# Patient Record
Sex: MALE
[De-identification: names, ages, dates, MRNs, and addresses within clinical notes are randomized per-mention and may not be internally consistent; named-entity substitution may affect disease eponyms.]

---

## 2017-07-28 PROBLEM — Z00.00 ENCOUNTER FOR PREVENTIVE HEALTH EXAMINATION: Status: ACTIVE | Noted: 2017-07-28

## 2017-08-02 ENCOUNTER — APPOINTMENT (OUTPATIENT)
Dept: DERMATOLOGY | Facility: CLINIC | Age: 57
End: 2017-08-02
Payer: MEDICARE

## 2017-08-02 VITALS — BODY MASS INDEX: 26.6 KG/M2 | WEIGHT: 190 LBS | HEIGHT: 71 IN

## 2017-08-02 DIAGNOSIS — Z86.39 PERSONAL HISTORY OF OTHER ENDOCRINE, NUTRITIONAL AND METABOLIC DISEASE: ICD-10-CM

## 2017-08-02 DIAGNOSIS — Z86.018 PERSONAL HISTORY OF OTHER BENIGN NEOPLASM: ICD-10-CM

## 2017-08-02 DIAGNOSIS — D49.2 NEOPLASM OF UNSPECIFIED BEHAVIOR OF BONE, SOFT TISSUE, AND SKIN: ICD-10-CM

## 2017-08-02 DIAGNOSIS — G90.50 COMPLEX REGIONAL PAIN SYNDROME I, UNSPECIFIED: ICD-10-CM

## 2017-08-02 DIAGNOSIS — Z85.828 PERSONAL HISTORY OF OTHER MALIGNANT NEOPLASM OF SKIN: ICD-10-CM

## 2017-08-02 DIAGNOSIS — Z87.39 PERSONAL HISTORY OF OTHER DISEASES OF THE MUSCULOSKELETAL SYSTEM AND CONNECTIVE TISSUE: ICD-10-CM

## 2017-08-02 DIAGNOSIS — Z86.79 PERSONAL HISTORY OF OTHER DISEASES OF THE CIRCULATORY SYSTEM: ICD-10-CM

## 2017-08-02 DIAGNOSIS — Z87.2 PERSONAL HISTORY OF DISEASES OF THE SKIN AND SUBCUTANEOUS TISSUE: ICD-10-CM

## 2017-08-02 DIAGNOSIS — Z80.8 FAMILY HISTORY OF MALIGNANT NEOPLASM OF OTHER ORGANS OR SYSTEMS: ICD-10-CM

## 2017-08-02 PROCEDURE — 99214 OFFICE O/P EST MOD 30 MIN: CPT

## 2017-08-02 RX ORDER — ASPIRIN 81 MG
81 TABLET, DELAYED RELEASE (ENTERIC COATED) ORAL
Refills: 0 | Status: ACTIVE | COMMUNITY

## 2017-08-02 RX ORDER — NAPROXEN 500 MG/1
500 TABLET ORAL
Refills: 0 | Status: ACTIVE | COMMUNITY

## 2018-07-28 PROBLEM — Z80.8 FAMILY HISTORY OF BASAL CELL CARCINOMA: Status: ACTIVE | Noted: 2017-08-02

## 2018-09-08 ENCOUNTER — TRANSCRIPTION ENCOUNTER (OUTPATIENT)
Age: 58
End: 2018-09-08

## 2019-01-30 ENCOUNTER — APPOINTMENT (OUTPATIENT)
Dept: DERMATOLOGY | Facility: CLINIC | Age: 59
End: 2019-01-30

## 2019-06-26 ENCOUNTER — APPOINTMENT (OUTPATIENT)
Dept: DERMATOLOGY | Facility: CLINIC | Age: 59
End: 2019-06-26
Payer: MEDICARE

## 2019-06-26 DIAGNOSIS — Z85.828 PERSONAL HISTORY OF OTHER MALIGNANT NEOPLASM OF SKIN: ICD-10-CM

## 2019-06-26 DIAGNOSIS — D22.5 MELANOCYTIC NEVI OF TRUNK: ICD-10-CM

## 2019-06-26 DIAGNOSIS — D18.00 HEMANGIOMA UNSPECIFIED SITE: ICD-10-CM

## 2019-06-26 DIAGNOSIS — D48.9 NEOPLASM OF UNCERTAIN BEHAVIOR, UNSPECIFIED: ICD-10-CM

## 2019-06-26 DIAGNOSIS — C43.9 MALIGNANT MELANOMA OF SKIN, UNSPECIFIED: ICD-10-CM

## 2019-06-26 PROCEDURE — 11102 TANGNTL BX SKIN SINGLE LES: CPT

## 2019-06-26 PROCEDURE — 99213 OFFICE O/P EST LOW 20 MIN: CPT | Mod: 25

## 2019-06-26 RX ORDER — SIMVASTATIN 20 MG/1
20 TABLET, FILM COATED ORAL
Refills: 0 | Status: ACTIVE | COMMUNITY

## 2019-06-26 RX ORDER — LOSARTAN POTASSIUM 100 MG/1
100 TABLET, FILM COATED ORAL
Refills: 0 | Status: ACTIVE | COMMUNITY

## 2019-06-26 RX ORDER — FINASTERIDE 1 MG/1
TABLET ORAL
Refills: 0 | Status: ACTIVE | COMMUNITY

## 2019-06-26 RX ORDER — AMLODIPINE BESYLATE 10 MG/1
10 TABLET ORAL
Refills: 0 | Status: ACTIVE | COMMUNITY

## 2019-06-26 RX ORDER — LOSARTAN POTASSIUM AND HYDROCHLOROTHIAZIDE 12.5; 1 MG/1; MG/1
100-12.5 TABLET ORAL
Refills: 0 | Status: DISCONTINUED | COMMUNITY
End: 2019-06-26

## 2019-06-26 RX ORDER — HYDROCHLOROTHIAZIDE 25 MG/1
25 TABLET ORAL
Refills: 0 | Status: ACTIVE | COMMUNITY

## 2019-06-26 RX ORDER — ATENOLOL 100 MG/1
100 TABLET ORAL
Refills: 0 | Status: DISCONTINUED | COMMUNITY
End: 2019-06-26

## 2019-06-26 RX ORDER — METFORMIN HYDROCHLORIDE 625 MG/1
TABLET ORAL
Refills: 0 | Status: ACTIVE | COMMUNITY

## 2019-06-26 RX ORDER — DULOXETINE HYDROCHLORIDE 60 MG/1
60 CAPSULE, DELAYED RELEASE ORAL
Refills: 0 | Status: ACTIVE | COMMUNITY

## 2019-06-26 NOTE — PHYSICAL EXAM
[Full Body Skin Exam Performed] : performed [FreeTextEntry3] : Skin examination performed of the face, neck, trunk, arms, legs; \par The patient is well, alert and oriented, pleasant and cooperative.\par Eyelids, conjunctivae, oral mucosa, digits and nails all normal.  \par No cervical adenopathy.\par \par Normal findings include:\par  \par Multiple benign nevi were noted. Some nevi exhibited mildly atypical features, but none were suspicious for malignancy. \par No changes in nevi from prior photograph; ; many regressed\par Nose healed well;\par \par Angiomas\par Lentigines\par scaling pearly erythematous patch right upper abdomen\par \par

## 2019-06-26 NOTE — ASSESSMENT
[FreeTextEntry1] : The patient was instructed to check portal and/or call the office in one week for biopsy results. \par Plan to treat by D&C if the biopsy is positive. \par \par No changes in nevus  from prior photograph; \par \par cont annual exams;

## 2019-06-26 NOTE — HISTORY OF PRESENT ILLNESS
[de-identified] : Pt. presents for skin check;\par one new inflamed lesion on the right upper abdomen;  bled once\par Multiple nevi, presents with body photos\par Hx MM, BCC\par

## 2019-07-10 ENCOUNTER — TRANSCRIPTION ENCOUNTER (OUTPATIENT)
Age: 59
End: 2019-07-10

## 2019-07-15 LAB — CORE LAB BIOPSY: NORMAL

## 2019-07-19 ENCOUNTER — TRANSCRIPTION ENCOUNTER (OUTPATIENT)
Age: 59
End: 2019-07-19

## 2019-08-05 ENCOUNTER — APPOINTMENT (OUTPATIENT)
Dept: DERMATOLOGY | Facility: CLINIC | Age: 59
End: 2019-08-05
Payer: MEDICARE

## 2019-08-05 DIAGNOSIS — C44.519 BASAL CELL CARCINOMA OF SKIN OF OTHER PART OF TRUNK: ICD-10-CM

## 2019-08-05 PROCEDURE — 17262 DSTRJ MAL LES T/A/L 1.1-2.0: CPT

## 2019-11-14 ENCOUNTER — APPOINTMENT (OUTPATIENT)
Dept: DERMATOLOGY | Facility: CLINIC | Age: 59
End: 2019-11-14
Payer: MEDICARE

## 2019-11-14 DIAGNOSIS — L57.0 ACTINIC KERATOSIS: ICD-10-CM

## 2019-11-14 PROCEDURE — 99213 OFFICE O/P EST LOW 20 MIN: CPT

## 2019-11-14 NOTE — ASSESSMENT
[FreeTextEntry1] : Likely AK - subtle;  within prior surgical site;\par Efudex x 2 weeks;  Risks and benefits of topical actinic keratosis treatments were discussed including redness, scaling, discomfort crusting, oozing, and recurrence. \par may wait until January\par Continue regular exams;

## 2019-11-14 NOTE — HISTORY OF PRESENT ILLNESS
[de-identified] : The patient has been fit in for urgent appointment. \par c/o scaling patches in area of prior Mohs procedure on nose;

## 2020-07-14 ENCOUNTER — APPOINTMENT (OUTPATIENT)
Dept: DERMATOLOGY | Facility: CLINIC | Age: 60
End: 2020-07-14

## 2022-01-13 ENCOUNTER — APPOINTMENT (OUTPATIENT)
Dept: ORTHOPEDIC SURGERY | Facility: CLINIC | Age: 62
End: 2022-01-13
Payer: MEDICARE

## 2022-01-13 ENCOUNTER — NON-APPOINTMENT (OUTPATIENT)
Age: 62
End: 2022-01-13

## 2022-01-13 PROCEDURE — 20610 DRAIN/INJ JOINT/BURSA W/O US: CPT | Mod: RT

## 2022-01-13 PROCEDURE — 99204 OFFICE O/P NEW MOD 45 MIN: CPT | Mod: 25

## 2022-01-13 NOTE — PHYSICAL EXAM
[de-identified] : Physical Exam:\par General: Well appearing, no acute distress\par Neurologic: A&Ox3, No focal deficits\par Head: NCAT without abrasions, lacerations, or ecchymosis to head, face, or scalp\par Eyes: No scleral icterus, no gross abnormalities\par Respiratory: Equal chest wall expansion bilaterally, no accessory muscle use\par Lymphatic: No lymphadenopathy palpated\par Skin: Warm and dry\par Psychiatric: Normal mood and affect\par \par Right Shoulder\par ·	Inspection/Palpation: no swelling or deformities. TTP biceps groove. \par ·	Range of Motion: no crepitus with ROM; Active FF 0 - 140 w/ hitching; ER at side 0 - 40; IR to L3; Passive FF 0 - 130; ER at side 0 - 45\par ·	Strength: forward elevation in scapular plane 4/5, internal rotation 4/5, external rotation 3/5, adduction 4/5 and abduction 3/5\par ·	Stability: no joint instability on provocative testing\par ·	Tests: Al test negative Neer negative negative drop arm test secondary to pain, bear hug test positive, Napolean sign negative, cross arm adduction negative, lift off sign positive, hornblowers sign negative, speeds test positive, Yergason's test negative, no bicipital groove tenderness, Mckeon's Active Compression test POS for biceps, whipple test POS, bicep's load II test negative, positive uppercut test\par \par Left Shoulder\par ·	Inspection/Palpation: no tenderness, swelling or deformities\par ·	Range of Motion: full and painless in all planes, no crepitus\par ·	Strength: forward elevation in scapular plane 5/5, internal rotation 5/5, external rotation 5/5, adduction 5/5 and abduction 5/5\par ·	Stability: no joint instability on provocative testing\par ·	Tests: Al test negative, Neer sign negative, negative drop arm test secondary to pain, bear hug test negative, Napolean sign negative, cross arm adduction negative, lift off sign positive, hornblowers sign negative, speeds test negative, Yergason's test negative, no bicipital groove tenderness, Mckeon's Active Compression test negative \par \par Right Elbow Exam\par \par Skin: Clean, dry, intact. No ecchymosis. No swelling. No palpable joint effusion. Tense biceps by attachment at radial head. \par ROM: RIGHT 0-140, full supination/pronation.  LEFT 0-140, full supination/pronation.\par Painful ROM: None\par Tenderness: [No] medial epicondyle pain. lateral epicondyle pain. [No] olecranon pain. pain at radial head.\par Strength: 4/5 elbow flexion, 5/5 elbow extension, 5/5 supination, 5/5 pronation\par Stability: Stable to vaus/valgus stress\par Vasc: 2+ radial pulse, <2s cap refill\par Sensation: In tact to light touch throughout\par Neuro: Negative tinels at ulnar canal, AIN/PIN/Ulnar nerve in tact to motor/sensation.\par \par Special Tests:\par Dorsiflexion Against Resistance: Negative\par Flexion of Wrist Against Resistance: positive \par Resistance Against Pronation: positive \par Resistance Against Supination: negative \par Tinel's Sign Cubital Tunnel: Negative  [de-identified] : DATE OF EXAM: 01/04/2022 - [facility]\par MRI OF RIGHT SHOULDER\par IMPRESSION: \par 1. Severe supraspinatus tendinosis. There is again a full-thickness tear extending to the central aspect of the supraspinatus insertion. Supraspinatus tear has progressed since the prior exam now with additional moderate to high-grade partial thickness tear within the critical zone. There is delamination of a bursal sided flap which is elevated and slightly lax.\par 2. Moderate to severe infraspinatus tendinosis with linear intrasubstance tearing within the critical zone extending to the myotendinous junction, more pronounced than on the prior exam.\par 3. Mild to moderate subscapularis tendinosis.\par 4. Mild glenohumeral arthrosis, new since the prior exam. There is mild blunting and irregularity of the anterior inferior labrum without a discrete tear.\par 5. 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 No evidence of fracture or stress reaction within the forearm.\par 2. Prominent insertional biceps tendinosis at the radial tuberosity is better appreciated on the dedicated elbow MRI performed on the same day. Findings of significantly progressed since the previous forearm MRI. There is partial-thickness tearing of the tendon at the radial tuberosity. There is\par bicipital radial bursitis.\par 3. Mild insertional triceps tendinosis and slight thickening of the overlying olecranon bursa\par \par DATE OF EXAM: 01/07/2022 - [facility]\par MRI OF RIGHT FOREARM\par IMPRESSION: \par \par 1. Severe insertional biceps tendinosis with a mild to moderate partial-thickness tear at the radial tuberosity, significantly progressed since the prior study. There is a prominent bicipital radial bursitis.\par 2. Mild common extensor and triceps tendinosis, unchanged.

## 2022-01-13 NOTE — ADDENDUM
[FreeTextEntry1] : Documented by Frederic Mart acting as a scribe for Dr. Hayes on 01/13/2022. \par \par All medical record entries made by the Scribe were at my, Dr. Hayes's, direction and\par personally dictated by me on 01/13/2022. I have reviewed the chart and agree that the record\par accurately reflects my personal performance of the history, physical exam, procedure and imaging.

## 2022-01-13 NOTE — HISTORY OF PRESENT ILLNESS
[Worsening] : worsening [3] : a current pain level of 3/10 [4] : an average pain level of 4/10 [de-identified] : TANIA SEVERINO is a 61 year male being seen for initial visit R shoulder and elbow pain. He has prev. s/x of R shoulder acromioplasty in 1994. Currently, he reports he is experiencing shoulder pain with all movements, especially ADLs. He denies clicking and popping in the shoulder. Patient denies numbness and tingling to the extremities.\par He additionally reports R forearm and elbow pain. He reports most elbow/forearm pain with twisting motions and gripping. He localizes most of his pain over lateral epicondyle, and over biceps belly. He reports he has had no prior cortisone injections. He had a schwannoma by S2 which was surgically removed, but he does have some residual symptoms and taking medications. He presents with MRI of R shoulder performed on 01/04/2022 at . MRI reveals: \par \par 1. Severe supraspinatus tendinosis. There is again a full-thickness tear extending to the central aspect of the supraspinatus insertion. Supraspinatus tear has progressed since the prior exam now with additional moderate to high-grade partial thickness tear within the critical zone. There is delamination of a bursal sided flap which is elevated and slightly lax.\par 2. Moderate to severe infraspinatus tendinosis with linear intrasubstance tearing within the critical zone extending to the myotendinous junction, more pronounced than on the prior exam.\par 3. Mild to moderate subscapularis tendinosis.\par 4. Mild glenohumeral arthrosis, new since the prior exam. There is mild blunting and irregularity of the anterior inferior labrum without a discrete tear.\par 5. Moderate to severe AC joint arthrosis.

## 2022-01-13 NOTE — PROCEDURE
[de-identified] : Injection: Right shoulder (Subacromial). \par Indication: RTC tearing\par \par A discussion was had with the patient regarding this procedure and all questions were answered. All risks, benefits and alternatives were discussed. These included but were not limited to bleeding, infection, and allergic reaction. Alcohol was used to clean the skin, and betadine was used to sterilize and prep the area in the posterior aspect of the right shoulder. Ethyl chloride spray was then used as a topical anesthetic. A 22-gauge needle was used to inject 3cc 1% xylocaine, 2cc 0.25% bupivacaine and 1cc of 40mg/mL triamcinolone acetonide into the right subacromial space. A sterile bandage was then applied. The patient tolerated the procedure well and there were no complications.

## 2022-01-13 NOTE — DISCUSSION/SUMMARY
[de-identified] : TANIA SEVERINO is a 61 year male being seen for initial visit R shoulder and elbow pain. He has prev. s/x of R shoulder acromioplasty in 1994. Currently, he reports he is experiencing shoulder pain with all movements, especially ADLs. He denies clicking and popping in the shoulder. Patient denies numbness and tingling to the extremities. He additionally reports R forearm and elbow pain. He reports most elbow/forearm pain with twisting motions and gripping. He reports he has had no prior cortisone injections. He had a schwannoma by S2 which was surgically removed, but he does have some residual symptoms and taking medications. \par \par We had a thorough discussion regarding the nature of his pain, the pathophysiology, as well as all treatment options. Based on his clinical exam, radiographs, and MRI findings, he has RTC tear especially supraspinatus, and tendinitis of distal biceps and lateral epicondylitis. I discussed operative and non-operative treatment modalities. Non operative treatment modalities are physical therapy, anti-inflammation, acetaminophen, HEP, cortisone injection. Operative treatment options include but not limited to revision surgery Right shoulder arthroscopic extensive debridement, lysis of adhesion, possible RTC repair, or RTC repair with patch. \par \par Pt due to his acute pain elected for a corticosteroid injection at today's visit and tolerated the procedure well. He should take it easy for the next 2-3 days while icing the joint. Patient was given prescription of formal physical therapy that he will perform 2x/wk for 6-8 wks. Patient will follow up in 6-8 wks for repeat clinical assessment. All questions were answered and the patient verbalized understanding. The patient is in agreement with this treatment plan. \par \par \par

## 2022-03-07 ENCOUNTER — APPOINTMENT (OUTPATIENT)
Dept: ORTHOPEDIC SURGERY | Facility: CLINIC | Age: 62
End: 2022-03-07
Payer: MEDICARE

## 2022-03-07 VITALS
WEIGHT: 190 LBS | HEART RATE: 78 BPM | HEIGHT: 71 IN | DIASTOLIC BLOOD PRESSURE: 72 MMHG | BODY MASS INDEX: 26.6 KG/M2 | SYSTOLIC BLOOD PRESSURE: 124 MMHG

## 2022-03-07 DIAGNOSIS — S46.011S STRAIN OF MUSCLE(S) AND TENDON(S) OF THE ROTATOR CUFF OF RIGHT SHOULDER, SEQUELA: ICD-10-CM

## 2022-03-07 DIAGNOSIS — M75.40 IMPINGEMENT SYNDROME OF UNSPECIFIED SHOULDER: ICD-10-CM

## 2022-03-07 DIAGNOSIS — S46.219A STRAIN OF MUSCLE, FASCIA AND TENDON OF OTHER PARTS OF BICEPS, UNSPECIFIED ARM, INITIAL ENCOUNTER: ICD-10-CM

## 2022-03-07 PROCEDURE — 99213 OFFICE O/P EST LOW 20 MIN: CPT

## 2022-03-07 NOTE — HISTORY OF PRESENT ILLNESS
[Improving] : improving [___ yrs] : [unfilled] year(s) ago [3] : a current pain level of 3/10 [4] : an average pain level of 4/10 [de-identified] : TANIA SEVERINO is a 61 year male being seen for f/u visit R shoulder and elbow pain. At last visit, patient elected for a cortisone injection that provided him relief up until 1 wk ago. Patient states mild pain at this time, which subsides with HEP and conservative care. He has performed PT, and states it has definitely helped his ROM, and strength. he denies numbness or tingling down to extremity. He is happy with his progress thus far. \par

## 2022-03-07 NOTE — PHYSICAL EXAM
[de-identified] : Physical Exam:\par General: Well appearing, no acute distress\par Neurologic: A&Ox3, No focal deficits\par Head: NCAT without abrasions, lacerations, or ecchymosis to head, face, or scalp\par Eyes: No scleral icterus, no gross abnormalities\par Respiratory: Equal chest wall expansion bilaterally, no accessory muscle use\par Lymphatic: No lymphadenopathy palpated\par Skin: Warm and dry\par Psychiatric: Normal mood and affect\par \par Right Shoulder\par ·	Inspection/Palpation: no swelling or deformities. TTP biceps groove. \par ·	Range of Motion: no crepitus with ROM; Active FF 0 - 165 w/ hitching; ER at side 0 - 40; IR to L3; Passive FF 0 - 150; ER at side 0 - 45\par ·	Strength: forward elevation in scapular plane 5/5, internal rotation 5/5, external rotation 5/5, adduction 5/5 and abduction 5/5 \par ·	Stability: no joint instability on provocative testing\par ·	Tests: Al test negative Neer negative negative drop arm test secondary to pain, bear hug test positive, Napolean sign negative, cross arm adduction negative, lift off sign positive, hornblowers sign negative, speeds test positive, Yergason's test negative, no bicipital groove tenderness, Mckeon's Active Compression test POS for biceps, whipple test POS, bicep's load II test negative, positive uppercut test\par \par Left Shoulder\par ·	Inspection/Palpation: no tenderness, swelling or deformities\par ·	Range of Motion: full and painless in all planes, no crepitus\par ·	Strength: forward elevation in scapular plane 5/5, internal rotation 5/5, external rotation 5/5, adduction 5/5 and abduction 5/5\par ·	Stability: no joint instability on provocative testing\par ·	Tests: Al test negative, Neer sign negative, negative drop arm test secondary to pain, bear hug test negative, Napolean sign negative, cross arm adduction negative, lift off sign positive, hornblowers sign negative, speeds test negative, Yergason's test negative, no bicipital groove tenderness, Mckeon's Active Compression test negative \par \par Right Elbow Exam\par \par Skin: Clean, dry, intact. No ecchymosis. No swelling. No palpable joint effusion. Tense biceps by attachment at radial head. \par ROM: RIGHT 0-140, full supination/pronation.  LEFT 0-140, full supination/pronation.\par Painful ROM: None\par Tenderness: [No] medial epicondyle pain. lateral epicondyle pain. [No] olecranon pain. pain at radial head.\par Strength: 4/5 elbow flexion, 5/5 elbow extension, 5/5 supination, 5/5 pronation\par Stability: Stable to vaus/valgus stress\par Vasc: 2+ radial pulse, <2s cap refill\par Sensation: In tact to light touch throughout\par Neuro: Negative tinels at ulnar canal, AIN/PIN/Ulnar nerve in tact to motor/sensation.\par \par Special Tests:\par Dorsiflexion Against Resistance: Negative\par Flexion of Wrist Against Resistance: positive \par Resistance Against Pronation: positive \par Resistance Against Supination: negative \par Tinel's Sign Cubital Tunnel: Negative  [de-identified] : DATE OF EXAM: 01/04/2022 - [facility]\par MRI OF RIGHT SHOULDER\par IMPRESSION: \par 1. Severe supraspinatus tendinosis. There is again a full-thickness tear extending to the central aspect of the supraspinatus insertion. Supraspinatus tear has progressed since the prior exam now with additional moderate to high-grade partial thickness tear within the critical zone. There is delamination of a bursal sided flap which is elevated and slightly lax.\par 2. Moderate to severe infraspinatus tendinosis with linear intrasubstance tearing within the critical zone extending to the myotendinous junction, more pronounced than on the prior exam.\par 3. Mild to moderate subscapularis tendinosis.\par 4. Mild glenohumeral arthrosis, new since the prior exam. There is mild blunting and irregularity of the anterior inferior labrum without a discrete tear.\par 5. 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 No evidence of fracture or stress reaction within the forearm.\par 2. Prominent insertional biceps tendinosis at the radial tuberosity is better appreciated on the dedicated elbow MRI performed on the same day. Findings of significantly progressed since the previous forearm MRI. There is partial-thickness tearing of the tendon at the radial tuberosity. There is\par bicipital radial bursitis.\par 3. Mild insertional triceps tendinosis and slight thickening of the overlying olecranon bursa\par \par DATE OF EXAM: 01/07/2022 - [facility]\par MRI OF RIGHT FOREARM\par IMPRESSION: \par \par 1. Severe insertional biceps tendinosis with a mild to moderate partial-thickness tear at the radial tuberosity, significantly progressed since the prior study. There is a prominent bicipital radial bursitis.\par 2. Mild common extensor and triceps tendinosis, unchanged.

## 2022-03-07 NOTE — DISCUSSION/SUMMARY
[de-identified] : TANIA SEVERINO is a 61 year male being seen for f/u visit R shoulder and elbow pain. At last visit, patient elected for a cortisone injection that provided him relief up until 1 wk ago. Patient states mild pain at this time, which subsides with HEP and conservative care. He has performed PT, and states it has definitely helped his ROM, and strength. he denies numbness or tingling down to extremity. He is happy with his progress thus far. \par \par At this point, patient ROM has greatly improved, and his pain has subsided, he is doing well and happy with his progress so far. I discussed operative and non-operative treatment modalities given his MRI findings. Patient is electing to continue non operative treatment options at this time. A cortisone injection might be consider in future depending on his wishes to go forward with surgery or not. Patient will follow up on prn basis for repeat clinical assessment. All questions were answered and the patient verbalized understanding. The patient is in agreement with this treatment plan. \par \par

## 2022-03-07 NOTE — ADDENDUM
[FreeTextEntry1] : Documented by Frederic Mart acting as a scribe for Dr. Hayes on 03/07/2022. \par \par All medical record entries made by the Scribe were at my, Dr. Hayes's, direction and\par personally dictated by me on 03/07/2022. I have reviewed the chart and agree that the record\par accurately reflects my personal performance of the history, physical exam, procedure and imaging.

## 2023-01-11 ENCOUNTER — APPOINTMENT (OUTPATIENT)
Dept: OTOLARYNGOLOGY | Facility: CLINIC | Age: 63
End: 2023-01-11
Payer: MEDICARE

## 2023-01-11 PROCEDURE — 99203 OFFICE O/P NEW LOW 30 MIN: CPT

## 2023-01-11 RX ORDER — FLUOROURACIL 50 MG/G
5 CREAM TOPICAL
Qty: 1 | Refills: 0 | Status: DISCONTINUED | COMMUNITY
Start: 2019-11-14 | End: 2023-01-11

## 2023-01-11 NOTE — PHYSICAL EXAM
[FreeTextEntry1] : pt w small superficial lesion R ant lat oral tongue. white rim w what appears to be granulation tissue at base.  [Normal] : no rashes

## 2023-01-11 NOTE — HISTORY OF PRESENT ILLNESS
[None] : No associated symptoms are reported. 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 NAKUL Bowman ([facility] [place], [place]) for tongue lesion. He reports noticing a white lesion on his tongue for several years. Overtime he has noticed some increased in size.Denies pain, unless it rubs against the teeth. Denies pain, dysphagia, dysphonia and or dyspnea.  Has not had biopsy. Had panorex scan at Dr. Bowman;s office\par hx. of melanoma on his back in 1991, basal cell on his nose MOHS done 5 years ago\par on asa 81mg (preventive) no cardiac stents. No lung issues. \par Non smoker, never smoked. No tobacco chewing. Most nights a glass of wine in the last few years.  \par \par mucosa recently sloughed off .  was present as lesion w white halo for 2 years.

## 2023-01-11 NOTE — CONSULT LETTER
[Dear  ___] : Dear  [unfilled], [Consult Letter:] : I had the pleasure of evaluating your patient, [unfilled]. [Please see my note below.] : Please see my note below. [Consult Closing:] : Thank you very much for allowing me to participate in the care of this patient.  If you have any questions, please do not hesitate to contact me. [Sincerely,] : Sincerely, [FreeTextEntry2] : Yola Bowman DDS ( [place], [place])  [FreeTextEntry3] : Brian Jason MD, FACS\par \par    [facility] [place]\par Associate Chair\par    Department of Otolaryngology\par \par Professor\par Otolaryngology & Molecular Medicine\par [facility] [employer]\par

## 2023-02-28 ENCOUNTER — APPOINTMENT (OUTPATIENT)
Dept: OTOLARYNGOLOGY | Facility: CLINIC | Age: 63
End: 2023-02-28
Payer: MEDICARE

## 2023-02-28 VITALS
TEMPERATURE: 97.3 F | RESPIRATION RATE: 16 BRPM | SYSTOLIC BLOOD PRESSURE: 96 MMHG | HEIGHT: 71 IN | OXYGEN SATURATION: 96 % | HEART RATE: 92 BPM | WEIGHT: 190 LBS | BODY MASS INDEX: 26.6 KG/M2 | DIASTOLIC BLOOD PRESSURE: 61 MMHG

## 2023-02-28 DIAGNOSIS — K14.8 OTHER DISEASES OF TONGUE: ICD-10-CM

## 2023-02-28 PROCEDURE — 99214 OFFICE O/P EST MOD 30 MIN: CPT

## 2023-02-28 NOTE — CONSULT LETTER
[Dear  ___] : Dear  [unfilled], [Courtesy Letter:] : I had the pleasure of seeing your patient, [unfilled], in my office today. [Please see my note below.] : Please see my note below. [Sincerely,] : Sincerely, [FreeTextEntry2] : Yola Bowman DDS ([place], [place]) [FreeTextEntry3] : Brian Jason MD, FACS\par \par    [facility] [place]\par Associate Chair\par    Department of Otolaryngology\par \par Professor\par Otolaryngology & Molecular Medicine\par [facility] [employer]\par

## 2023-02-28 NOTE — HISTORY OF PRESENT ILLNESS
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  pt feels lesikon resolved w guard that was rec.

## 2023-03-13 ENCOUNTER — APPOINTMENT (OUTPATIENT)
Dept: OTOLARYNGOLOGY | Facility: HOSPITAL | Age: 63
End: 2023-03-13

## 2024-02-28 ENCOUNTER — APPOINTMENT (OUTPATIENT)
Dept: OTOLARYNGOLOGY | Facility: CLINIC | Age: 64
End: 2024-02-28